# Patient Record
Sex: FEMALE | Race: WHITE | NOT HISPANIC OR LATINO | URBAN - METROPOLITAN AREA
[De-identification: names, ages, dates, MRNs, and addresses within clinical notes are randomized per-mention and may not be internally consistent; named-entity substitution may affect disease eponyms.]

---

## 2018-07-15 ENCOUNTER — INPATIENT (INPATIENT)
Facility: HOSPITAL | Age: 23
LOS: 1 days | Discharge: HOME | End: 2018-07-17
Attending: PLASTIC SURGERY | Admitting: PLASTIC SURGERY

## 2018-07-15 VITALS
RESPIRATION RATE: 20 BRPM | HEART RATE: 82 BPM | SYSTOLIC BLOOD PRESSURE: 87 MMHG | OXYGEN SATURATION: 100 % | DIASTOLIC BLOOD PRESSURE: 58 MMHG | TEMPERATURE: 97 F

## 2018-07-15 DIAGNOSIS — Z3A.25 25 WEEKS GESTATION OF PREGNANCY: ICD-10-CM

## 2018-07-15 DIAGNOSIS — T14.8XXA OTHER INJURY OF UNSPECIFIED BODY REGION, INITIAL ENCOUNTER: ICD-10-CM

## 2018-07-15 LAB
ALBUMIN SERPL ELPH-MCNC: 3.4 G/DL — LOW (ref 3.5–5.2)
ALP SERPL-CCNC: 75 U/L — SIGNIFICANT CHANGE UP (ref 30–115)
ALT FLD-CCNC: 10 U/L — SIGNIFICANT CHANGE UP (ref 0–41)
ANION GAP SERPL CALC-SCNC: 12 MMOL/L — SIGNIFICANT CHANGE UP (ref 7–14)
AST SERPL-CCNC: 16 U/L — SIGNIFICANT CHANGE UP (ref 0–41)
BASE EXCESS BLDV CALC-SCNC: 1.1 MMOL/L — SIGNIFICANT CHANGE UP (ref -2–2)
BILIRUB SERPL-MCNC: <0.2 MG/DL — SIGNIFICANT CHANGE UP (ref 0.2–1.2)
BUN SERPL-MCNC: 9 MG/DL — LOW (ref 10–20)
CA-I SERPL-SCNC: 1.19 MMOL/L — SIGNIFICANT CHANGE UP (ref 1.12–1.3)
CALCIUM SERPL-MCNC: 8.9 MG/DL — SIGNIFICANT CHANGE UP (ref 8.5–10.1)
CHLORIDE SERPL-SCNC: 106 MMOL/L — SIGNIFICANT CHANGE UP (ref 98–110)
CO2 SERPL-SCNC: 24 MMOL/L — SIGNIFICANT CHANGE UP (ref 17–32)
CREAT SERPL-MCNC: 0.5 MG/DL — LOW (ref 0.7–1.5)
GAS PNL BLDV: 139 MMOL/L — SIGNIFICANT CHANGE UP (ref 136–145)
GAS PNL BLDV: SIGNIFICANT CHANGE UP
GLUCOSE SERPL-MCNC: 90 MG/DL — SIGNIFICANT CHANGE UP (ref 70–99)
HCO3 BLDV-SCNC: 26 MMOL/L — SIGNIFICANT CHANGE UP (ref 22–29)
HCT VFR BLD CALC: 26.9 % — LOW (ref 37–47)
HCT VFR BLDA CALC: 30.5 % — LOW (ref 34–44)
HGB BLD CALC-MCNC: 10 G/DL — LOW (ref 14–18)
HGB BLD-MCNC: 9 G/DL — LOW (ref 12–16)
LACTATE BLDV-MCNC: 0.7 MMOL/L — SIGNIFICANT CHANGE UP (ref 0.5–1.6)
MCHC RBC-ENTMCNC: 28.2 PG — SIGNIFICANT CHANGE UP (ref 27–31)
MCHC RBC-ENTMCNC: 33.5 G/DL — SIGNIFICANT CHANGE UP (ref 32–37)
MCV RBC AUTO: 84.3 FL — SIGNIFICANT CHANGE UP (ref 81–99)
NRBC # BLD: 0 /100 WBCS — SIGNIFICANT CHANGE UP (ref 0–0)
PCO2 BLDV: 39 MMHG — LOW (ref 41–51)
PH BLDV: 7.42 — SIGNIFICANT CHANGE UP (ref 7.26–7.43)
PLATELET # BLD AUTO: 178 K/UL — SIGNIFICANT CHANGE UP (ref 130–400)
PO2 BLDV: 30 MMHG — SIGNIFICANT CHANGE UP (ref 20–40)
POTASSIUM BLDV-SCNC: 3.3 MMOL/L — SIGNIFICANT CHANGE UP (ref 3.3–5.6)
POTASSIUM SERPL-MCNC: 3.7 MMOL/L — SIGNIFICANT CHANGE UP (ref 3.5–5)
POTASSIUM SERPL-SCNC: 3.7 MMOL/L — SIGNIFICANT CHANGE UP (ref 3.5–5)
PROT SERPL-MCNC: 6.2 G/DL — SIGNIFICANT CHANGE UP (ref 6–8)
RBC # BLD: 3.19 M/UL — LOW (ref 4.2–5.4)
RBC # FLD: 15.2 % — HIGH (ref 11.5–14.5)
SAO2 % BLDV: 57 % — SIGNIFICANT CHANGE UP
SODIUM SERPL-SCNC: 142 MMOL/L — SIGNIFICANT CHANGE UP (ref 135–146)
WBC # BLD: 10.61 K/UL — SIGNIFICANT CHANGE UP (ref 4.8–10.8)
WBC # FLD AUTO: 10.61 K/UL — SIGNIFICANT CHANGE UP (ref 4.8–10.8)

## 2018-07-15 RX ORDER — BACITRACIN ZINC 500 UNIT/G
1 OINTMENT IN PACKET (EA) TOPICAL
Qty: 0 | Refills: 0 | Status: DISCONTINUED | OUTPATIENT
Start: 2018-07-15 | End: 2018-07-16

## 2018-07-15 RX ORDER — ACETAMINOPHEN 500 MG
650 TABLET ORAL EVERY 6 HOURS
Qty: 0 | Refills: 0 | Status: DISCONTINUED | OUTPATIENT
Start: 2018-07-15 | End: 2018-07-16

## 2018-07-15 RX ORDER — SODIUM CHLORIDE 9 MG/ML
3 INJECTION INTRAMUSCULAR; INTRAVENOUS; SUBCUTANEOUS EVERY 8 HOURS
Qty: 0 | Refills: 0 | Status: DISCONTINUED | OUTPATIENT
Start: 2018-07-15 | End: 2018-07-16

## 2018-07-15 RX ORDER — SODIUM CHLORIDE 9 MG/ML
1000 INJECTION INTRAMUSCULAR; INTRAVENOUS; SUBCUTANEOUS ONCE
Qty: 0 | Refills: 0 | Status: COMPLETED | OUTPATIENT
Start: 2018-07-15 | End: 2018-07-15

## 2018-07-15 RX ORDER — AMPICILLIN TRIHYDRATE 250 MG
1 CAPSULE ORAL ONCE
Qty: 0 | Refills: 0 | Status: COMPLETED | OUTPATIENT
Start: 2018-07-15 | End: 2018-07-15

## 2018-07-15 RX ORDER — CEFAZOLIN SODIUM 1 G
2000 VIAL (EA) INJECTION EVERY 8 HOURS
Qty: 0 | Refills: 0 | Status: DISCONTINUED | OUTPATIENT
Start: 2018-07-15 | End: 2018-07-16

## 2018-07-15 RX ORDER — SODIUM CHLORIDE 9 MG/ML
1000 INJECTION INTRAMUSCULAR; INTRAVENOUS; SUBCUTANEOUS
Qty: 0 | Refills: 0 | Status: DISCONTINUED | OUTPATIENT
Start: 2018-07-15 | End: 2018-07-16

## 2018-07-15 RX ADMIN — Medication 208 GRAM(S): at 23:45

## 2018-07-15 RX ADMIN — SODIUM CHLORIDE 2000 MILLILITER(S): 9 INJECTION INTRAMUSCULAR; INTRAVENOUS; SUBCUTANEOUS at 21:33

## 2018-07-15 RX ADMIN — SODIUM CHLORIDE 3 MILLILITER(S): 9 INJECTION INTRAMUSCULAR; INTRAVENOUS; SUBCUTANEOUS at 21:33

## 2018-07-15 NOTE — H&P ADULT - ASSESSMENT
23 year old female, currently 25 weeks pregnant (Due Date 10/28/2018), and no past medical history here with a RLE infected wound s/p spider bite 2 months ago, admitted to burn service for IV abx, local wound care and possible debridement.

## 2018-07-15 NOTE — ED ADULT NURSE NOTE - OBJECTIVE STATEMENT
RLE wound X 2 mo, no discharge, redness noted. No swelling. RLE wound X 2 mo, no discharge, redness noted. No swelling. Pt is 25 wk pregnant

## 2018-07-15 NOTE — ED PROVIDER NOTE - ATTENDING CONTRIBUTION TO CARE
24 y/o female, 25 weeks pregnant in ER with c/o eval for non-healing RLE wound.  Pt states she had ? bite to area 2 months ago, became red/swollen, went to PMD and took course of keflex - wound opened up and drained pus after abx but has never healed.  remains open, states drains some pus every day. no f/c.  no spreading erythema.  Pt went to [place] burn clinic earlier this week for eval and was told she will need debridement.  No other complaints, follows with midwife in [place] for pregnancy, denies any pregnancy related complications.  pe - nad, nc/at, eomi, perrl, op - clear, neck supple, cta b/, no w/r/r, rrr, abd- gravid, non-tender, RLE - + ~ 4 x 3 cm ulceration to medial distal lower leg, no drainage, + mild surrounding erythema, 2+ distal pulses, no calf swelling/tenderness  -burn eval.

## 2018-07-15 NOTE — ED PROVIDER NOTE - MEDICAL DECISION MAKING DETAILS
22 y/o female, 25 weeks pregnant, admitted to burn for debridement of non-healing RLE wound/ulcer.  seen by gyn in ER, + FH (done by gyn).

## 2018-07-15 NOTE — ED PROVIDER NOTE - PHYSICAL EXAMINATION
Vital Signs: I have reviewed the initial vital signs.  Constitutional: well-nourished, appears stated age, no acute distress  Right lower ext: + 4cm oval wound noted to medial right tibial area with redness, mild swelling and discharge; pulses and sensation intact; no calf tenderness; no lymphangitis.

## 2018-07-15 NOTE — ED PROVIDER NOTE - NS ED ROS FT
Constitutional: (-) fever  Cardiovascular: (-) syncope  Integumentary: (-) rash  Musculoskeltal: see hpi  Neurological: (-) altered mental status

## 2018-07-15 NOTE — H&P ADULT - PROBLEM SELECTOR PLAN 1
Admit to burn service  IV abx  IV fluids  Add to OR schedule tomorrow 7/16 for possible debridement with local anesthesia  Local wound care: bacitracin, adaptic  Pain control

## 2018-07-15 NOTE — H&P ADULT - HISTORY OF PRESENT ILLNESS
23 year old female, currently 25 weeks pregnant (Due Date 10/28/2018), with no past medical history presented to the ED c/o right lower ext wound infection from a spider bite 2 months ago. Patient states she was seen by her pmd given abx (keflex) 1month ago. Overtime the wound became swollen and after she was started on antibiotics the wound opened and puss drained. Since then patient has been applying manuka honey but has not seen any improvement with wound healing. Patient was seen in Burn Clinic with Dr. Sheppard last week and sent in for burn service evaluation/debridement. Patient reports pain that has improved over last couple of days. Patient states her pregnancy has been uncomplicated thus far. Pt deneis fever, chills, nausea, vomiting.

## 2018-07-15 NOTE — H&P ADULT - NSHPPHYSICALEXAM_GEN_ALL_CORE
General: breathing comfortably, in no acute distress  RLE Wound: Medial aspect of ankle, ~4cm x 2cm open wound with surrounding erythema and yellow inside, with thin purulent fluid, tender to palpation.

## 2018-07-15 NOTE — ED ADULT TRIAGE NOTE - CHIEF COMPLAINT QUOTE
Pt with right lower leg open wound from 2 month ago  from spider  bite infected need  debridement, pt 25 weeks pregnant .

## 2018-07-15 NOTE — ED PROVIDER NOTE - OBJECTIVE STATEMENT
23 yold female to Ed with no signif med hx currently 25 weeks pregnant c/o right lower ext wound infection; pt s/p ?spider bite 2 months ago seen by pmd given abx(keflex) 1month ago; pt with persistence of wound - see by Dr. Sheppard last week and sent if for burn service evaluation/debridement; pt deneis fever, chills, n/v; + discharge from wound;

## 2018-07-16 ENCOUNTER — TRANSCRIPTION ENCOUNTER (OUTPATIENT)
Age: 23
End: 2018-07-16

## 2018-07-16 ENCOUNTER — RESULT REVIEW (OUTPATIENT)
Age: 23
End: 2018-07-16

## 2018-07-16 VITALS
OXYGEN SATURATION: 100 % | TEMPERATURE: 97 F | DIASTOLIC BLOOD PRESSURE: 55 MMHG | HEART RATE: 80 BPM | RESPIRATION RATE: 18 BRPM | SYSTOLIC BLOOD PRESSURE: 90 MMHG

## 2018-07-16 LAB
ANION GAP SERPL CALC-SCNC: 17 MMOL/L — HIGH (ref 7–14)
BLD GP AB SCN SERPL QL: SIGNIFICANT CHANGE UP
BUN SERPL-MCNC: 5 MG/DL — LOW (ref 10–20)
CALCIUM SERPL-MCNC: 8.6 MG/DL — SIGNIFICANT CHANGE UP (ref 8.5–10.1)
CHLORIDE SERPL-SCNC: 100 MMOL/L — SIGNIFICANT CHANGE UP (ref 98–110)
CO2 SERPL-SCNC: 20 MMOL/L — SIGNIFICANT CHANGE UP (ref 17–32)
CREAT SERPL-MCNC: 0.5 MG/DL — LOW (ref 0.7–1.5)
GLUCOSE SERPL-MCNC: 67 MG/DL — LOW (ref 70–99)
HCT VFR BLD CALC: 25.6 % — LOW (ref 37–47)
HGB BLD-MCNC: 8.5 G/DL — LOW (ref 12–16)
MAGNESIUM SERPL-MCNC: 1.8 MG/DL — SIGNIFICANT CHANGE UP (ref 1.8–2.4)
MCHC RBC-ENTMCNC: 28.1 PG — SIGNIFICANT CHANGE UP (ref 27–31)
MCHC RBC-ENTMCNC: 33.2 G/DL — SIGNIFICANT CHANGE UP (ref 32–37)
MCV RBC AUTO: 84.8 FL — SIGNIFICANT CHANGE UP (ref 81–99)
NRBC # BLD: 0 /100 WBCS — SIGNIFICANT CHANGE UP (ref 0–0)
PHOSPHATE SERPL-MCNC: 3.5 MG/DL — SIGNIFICANT CHANGE UP (ref 2.1–4.9)
PLATELET # BLD AUTO: 160 K/UL — SIGNIFICANT CHANGE UP (ref 130–400)
POTASSIUM SERPL-MCNC: 3.6 MMOL/L — SIGNIFICANT CHANGE UP (ref 3.5–5)
POTASSIUM SERPL-SCNC: 3.6 MMOL/L — SIGNIFICANT CHANGE UP (ref 3.5–5)
RBC # BLD: 3.02 M/UL — LOW (ref 4.2–5.4)
RBC # FLD: 15.2 % — HIGH (ref 11.5–14.5)
SODIUM SERPL-SCNC: 137 MMOL/L — SIGNIFICANT CHANGE UP (ref 135–146)
TYPE + AB SCN PNL BLD: SIGNIFICANT CHANGE UP
WBC # BLD: 12.43 K/UL — HIGH (ref 4.8–10.8)
WBC # FLD AUTO: 12.43 K/UL — HIGH (ref 4.8–10.8)

## 2018-07-16 RX ORDER — ACETAMINOPHEN 500 MG
650 TABLET ORAL EVERY 6 HOURS
Qty: 0 | Refills: 0 | Status: DISCONTINUED | OUTPATIENT
Start: 2018-07-16 | End: 2018-07-17

## 2018-07-16 RX ORDER — MORPHINE SULFATE 50 MG/1
2 CAPSULE, EXTENDED RELEASE ORAL
Qty: 0 | Refills: 0 | Status: DISCONTINUED | OUTPATIENT
Start: 2018-07-16 | End: 2018-07-17

## 2018-07-16 RX ORDER — OXYCODONE AND ACETAMINOPHEN 5; 325 MG/1; MG/1
1 TABLET ORAL EVERY 4 HOURS
Qty: 0 | Refills: 0 | Status: DISCONTINUED | OUTPATIENT
Start: 2018-07-16 | End: 2018-07-17

## 2018-07-16 RX ORDER — AMPICILLIN SODIUM AND SULBACTAM SODIUM 250; 125 MG/ML; MG/ML
1.5 INJECTION, POWDER, FOR SUSPENSION INTRAMUSCULAR; INTRAVENOUS EVERY 6 HOURS
Qty: 0 | Refills: 0 | Status: DISCONTINUED | OUTPATIENT
Start: 2018-07-16 | End: 2018-07-16

## 2018-07-16 RX ORDER — AMPICILLIN SODIUM AND SULBACTAM SODIUM 250; 125 MG/ML; MG/ML
1.5 INJECTION, POWDER, FOR SUSPENSION INTRAMUSCULAR; INTRAVENOUS EVERY 6 HOURS
Qty: 0 | Refills: 0 | Status: DISCONTINUED | OUTPATIENT
Start: 2018-07-16 | End: 2018-07-17

## 2018-07-16 RX ORDER — SODIUM CHLORIDE 9 MG/ML
1000 INJECTION, SOLUTION INTRAVENOUS
Qty: 0 | Refills: 0 | Status: DISCONTINUED | OUTPATIENT
Start: 2018-07-16 | End: 2018-07-16

## 2018-07-16 RX ORDER — BACITRACIN ZINC 500 UNIT/G
1 OINTMENT IN PACKET (EA) TOPICAL
Qty: 0 | Refills: 0 | Status: DISCONTINUED | OUTPATIENT
Start: 2018-07-16 | End: 2018-07-17

## 2018-07-16 RX ORDER — ACETAMINOPHEN 500 MG
2 TABLET ORAL
Qty: 0 | Refills: 0 | COMMUNITY
Start: 2018-07-16

## 2018-07-16 RX ORDER — ACETAMINOPHEN 500 MG
1000 TABLET ORAL ONCE
Qty: 0 | Refills: 0 | Status: DISCONTINUED | OUTPATIENT
Start: 2018-07-16 | End: 2018-07-16

## 2018-07-16 RX ADMIN — SODIUM CHLORIDE 75 MILLILITER(S): 9 INJECTION INTRAMUSCULAR; INTRAVENOUS; SUBCUTANEOUS at 00:24

## 2018-07-16 RX ADMIN — SODIUM CHLORIDE 3 MILLILITER(S): 9 INJECTION INTRAMUSCULAR; INTRAVENOUS; SUBCUTANEOUS at 05:51

## 2018-07-16 RX ADMIN — Medication 1 APPLICATION(S): at 05:52

## 2018-07-16 RX ADMIN — Medication 650 MILLIGRAM(S): at 23:49

## 2018-07-16 RX ADMIN — Medication 650 MILLIGRAM(S): at 21:58

## 2018-07-16 RX ADMIN — AMPICILLIN SODIUM AND SULBACTAM SODIUM 100 GRAM(S): 250; 125 INJECTION, POWDER, FOR SUSPENSION INTRAMUSCULAR; INTRAVENOUS at 20:01

## 2018-07-16 RX ADMIN — AMPICILLIN SODIUM AND SULBACTAM SODIUM 100 GRAM(S): 250; 125 INJECTION, POWDER, FOR SUSPENSION INTRAMUSCULAR; INTRAVENOUS at 06:48

## 2018-07-16 RX ADMIN — SODIUM CHLORIDE 3 MILLILITER(S): 9 INJECTION INTRAMUSCULAR; INTRAVENOUS; SUBCUTANEOUS at 13:07

## 2018-07-16 RX ADMIN — SODIUM CHLORIDE 75 MILLILITER(S): 9 INJECTION INTRAMUSCULAR; INTRAVENOUS; SUBCUTANEOUS at 12:04

## 2018-07-16 RX ADMIN — AMPICILLIN SODIUM AND SULBACTAM SODIUM 100 GRAM(S): 250; 125 INJECTION, POWDER, FOR SUSPENSION INTRAMUSCULAR; INTRAVENOUS at 12:04

## 2018-07-16 NOTE — DISCHARGE NOTE ADULT - CARE PROVIDER_API CALL
Diony Sheppard), Plastic Surgery  [address] [place] [address]  Phone: [phone]  Fax: [phone]

## 2018-07-16 NOTE — PRE-OP CHECKLIST - 1.
pt to pre op accompanied by family,rt ac ivl in place, pt is 25 weeks pregnant, ,fetal monitoring done in pre op by  gyn resident,right lower extremity kerlex dressing noted.pt anxious ,emotional support rendered.

## 2018-07-16 NOTE — PHYSICAL THERAPY INITIAL EVALUATION ADULT - SPECIFY REASON(S)
No PT secondary pt on the way to OR for procedure.
Pt &  educated on purpose of PT, however both pt &  declining @ this time. continued below...

## 2018-07-16 NOTE — DISCHARGE NOTE ADULT - PLAN OF CARE
Proper wound healing Follow up in clinic 7/17 for detailed wound care instructions  Do not shower or change bandage tonight 7/16

## 2018-07-16 NOTE — BRIEF OPERATIVE NOTE - POST-OP DX
Wound of right leg  07/16/2018  infected 5 x 4 cm - multiple ulcers/ sinuses  Active  Diony Sheppard

## 2018-07-16 NOTE — CHART NOTE - NSCHARTNOTEFT_GEN_A_CORE
PGY2 Note    Pt seen and examined at bedside for  pre-op FH  FH 160bpm, +FM    -Will evaluated postop for FH    Dr. Desouza and Dr. Tyson aware.

## 2018-07-16 NOTE — PROGRESS NOTE ADULT - ATTENDING COMMENTS
Long discussion with pt and  regarding surgery and further mgmt, including poss STSG. Questions addressed

## 2018-07-16 NOTE — BRIEF OPERATIVE NOTE - PROCEDURE
<<-----Click on this checkbox to enter Procedure Debridement and irrigation of wound of extremity  07/16/2018  Right leg - 5 x 4 cm   to and including subcutaneous fat  Active  KACIE

## 2018-07-16 NOTE — PROGRESS NOTE ADULT - SUBJECTIVE AND OBJECTIVE BOX
Pt admitted o/n see H&P   stable overnight -anxious to have procedure done  Pt: no specific complaints; reports worsening of wound following office visit- photo reviewed   No acute events o/n  Vital Signs Last 24 Hrs  T(C): 36.6 (16 Jul 2018 09:10), Max: 36.6 (15 Jul 2018 23:49)  T(F): 97.8 (16 Jul 2018 09:10), Max: 97.8 (15 Jul 2018 23:49)  HR: 71 (16 Jul 2018 09:10) (71 - 82)  BP: 105/75 (16 Jul 2018 09:10) (82/45 - 105/75)  RR: 19 (16 Jul 2018 09:10) (16 - 24)  SpO2: 100% (16 Jul 2018 09:10) (100% - 100%)      07-15    142  |  106  |  9<L>  ----------------------------<  90  3.7   |  24  |  0.5<L>    Ca    8.9      15 Jul 2018 21:14    TPro  6.2  /  Alb  3.4<L>  /  TBili  <0.2  /  DBili  x   /  AST  16  /  ALT  10  /  AlkPhos  75  07-15                          9.0    10.61 )-----------( 178      ( 15 Jul 2018 21:14 )             26.9     EXAM:  Wound: right leg - 5 x 4 cm area of skin with erythema, inferior 1cm ulcer and multiple sinuses. purulent drainage expressed               tender

## 2018-07-16 NOTE — DISCHARGE NOTE ADULT - ADDITIONAL INSTRUCTIONS
Call [phone] tonight and leave a message stating that you were discharged from the hospital today and need to be seen in the office tomorrow 7/17. If you do not receive a call from the office by 9 AM on 7/17 then call again.

## 2018-07-16 NOTE — DISCHARGE NOTE ADULT - HOSPITAL COURSE
Patient is a 24 y/o F  EGA 25 weeks no other PMH presented with 2 week old infected spider bite. Patient was treated with IV fluids, IV antibiotics, local wound care, pain management. Patient was taken to the OR on  for debridement and irrigation of the wound. Patient was encouraged to stay for another 24 hours but was anxious to go home so the patient was cleared by the attending to be discharged with follow up in clinic .

## 2018-07-16 NOTE — DISCHARGE NOTE ADULT - PATIENT PORTAL LINK FT
You can access the [facility] Patient Portal, offered by [facility], by registering with the following website: [email or web address]

## 2018-07-16 NOTE — CONSULT NOTE ADULT - SUBJECTIVE AND OBJECTIVE BOX
PATEL, SISI  23y, Female  Allergy: Acidophilus (Unknown)      HPI:  23 year old female, currently 25 weeks pregnant (Due Date 10/28/2018), with no past medical history presented to the ED c/o right lower ext wound infection from a spider bite 2 months ago. Patient states she was seen by her pmd given abx (keflex) 1month ago. Overtime the wound became swollen and after she was started on antibiotics the wound opened and puss drained. Since then patient has been applying manuka honey but has not seen any improvement with wound healing. Patient was seen in Burn Clinic with Dr. Sheppard last week and sent in for burn service evaluation/debridement. Patient reports pain that has improved over last couple of days. Patient states her pregnancy has been uncomplicated thus far. Pt deneis fever, chills, nausea, vomiting. (15 Jul 2018 23:25)    FAMILY HISTORY:    PAST MEDICAL & SURGICAL HISTORY:  No pertinent past medical history  No significant past surgical history        VITALS:  T(F): 97.8, Max: 97.8 (07-15-18 @ 23:49)  HR: 71  BP: 105/75  RR: 19Vital Signs Last 24 Hrs  T(C): 36.6 (16 Jul 2018 09:10), Max: 36.6 (15 Jul 2018 23:49)  T(F): 97.8 (16 Jul 2018 09:10), Max: 97.8 (15 Jul 2018 23:49)  HR: 71 (16 Jul 2018 09:10) (71 - 82)  BP: 105/75 (16 Jul 2018 09:10) (82/45 - 105/75)  BP(mean): --  RR: 19 (16 Jul 2018 09:10) (16 - 24)  SpO2: 100% (16 Jul 2018 09:10) (100% - 100%)    TESTS & MEASUREMENTS:                        9.0    10.61 )-----------( 178      ( 15 Jul 2018 21:14 )             26.9     07-15    142  |  106  |  9<L>  ----------------------------<  90  3.7   |  24  |  0.5<L>    Ca    8.9      15 Jul 2018 21:14    TPro  6.2  /  Alb  3.4<L>  /  TBili  <0.2  /  DBili  x   /  AST  16  /  ALT  10  /  AlkPhos  75  07-15    LIVER FUNCTIONS - ( 15 Jul 2018 21:14 )  Alb: 3.4 g/dL / Pro: 6.2 g/dL / ALK PHOS: 75 U/L / ALT: 10 U/L / AST: 16 U/L / GGT: x                   RADIOLOGY & ADDITIONAL TESTS:    ANTIBIOTICS:

## 2018-07-16 NOTE — BRIEF OPERATIVE NOTE - PRE-OP DX
Wound of right leg  07/16/2018  infected - 5 x 4 cm - miltiple ulcerations and sinuses  Active  Diony Sheppard

## 2018-07-16 NOTE — OCCUPATIONAL THERAPY INITIAL EVALUATION ADULT - REHAB POTENTIAL, OT EVAL
Pt ind with adls and transfers. Pt educated re hep to maintain rom during healing phase. OT to cont as needed.

## 2018-07-16 NOTE — CHART NOTE - NSCHARTNOTEFT_GEN_A_CORE
PACU ANESTHESIA ADMISSION NOTE      Procedure: debridement of right lower extremity     Post op diagnosis:  infected wound of right leg       ____  Intubated  TV:______       Rate: ______      FiO2: ______    _x___  Patent Airway    _x___  Full return of protective reflexes    _x___  Full recovery from anesthesia / back to baseline status    Vitals:  T(C): 98.3  HR: 80  BP: 94/60  RR: 16  SpO2: 100%     Mental Status:  _x___ Awake   ___x__ Alert   _____ Drowsy   _____ Sedated    Nausea/Vomiting:  _x___  NO       ______Yes,   See Post - Op Orders         Pain Scale (0-10):  __0___    Treatment: ____ None    _x___ See Post - Op/PCA Orders    Post - Operative Fluids:   ____ Oral   __x__ See Post - Op Orders    Plan: Discharge:   ____Home       __x___Floor     _____Critical Care    _____  Other:_________________    Comments:  No anesthesia issues or complications noted.  Discharge when criteria met.  Report given to receiving RN, all questions answered.    OB/GYN called, to come to recovery to do a post op ultrasound.

## 2018-07-16 NOTE — DISCHARGE NOTE ADULT - CARE PLAN
Principal Discharge DX:	Infected wound  Goal:	Proper wound healing  Assessment and plan of treatment:	Follow up in clinic 7/17 for detailed wound care instructions  Do not shower or change bandage tonight 7/16

## 2018-07-16 NOTE — CHART NOTE - NSCHARTNOTEFT_GEN_A_CORE
PGY 2 Note    Pt evaluated post op, FH pos, 153 bpm, +gross fetal movement.     Dr. Desouza and Dr. Mercado aware.

## 2018-07-16 NOTE — CONSULT NOTE ADULT - SUBJECTIVE AND OBJECTIVE BOX
Chief Complaint: spider bite on leg    HPI:  23  at 25w (BECCA 10/28/18) dated by LMP, here admitted to Burn for right leg spider bite. Pt had a right leg infected area on lower leg, that has been getting worse over the past 2 months, with multiple courses of PO abx. Pt has associated pain and draining from the wound. Pt is also pregnant, follows [place] mid[facility], has uncomplicated prenatal care. Last saw CNM was 1 week ago. 20w anatomy sono was normal. Pt denies any obstetric complaints, denies LOF, VB, or CTX. She feels good fetal movement. Denies complications with the pregnancy.     MEDICATIONS:  ampicillin  IVPB: 208 IV Intermittent (07-15 @ 23:45)  (Floorstock): 1 &lt;See Task&gt; (07-15 @ 23:31)  sodium chloride 0.9% lock flush: 3 IV Push (07-15 @ 21:33)  sodium chloride 0.9% Bolus: 2000 IV Bolus (07-15 @ 21:33)    ALLERGIES:  Acidophilus (Unknown)    PAST MEDICAL AND SURGICAL HISTORY:  PAST MEDICAL & SURGICAL HISTORY:  No pertinent past medical history  No significant past surgical history    OB/GYN HISTORY:     LMP:  unsure    Cycles: regular  Gyn Hx: denies history of abnormal pap, STI, ovarian cysts, or uterine fibroids  Obstetric Hx: primigravid     FAMILY HISTORY:  Non-contributory    SOCIAL HISTORY:   Denies cigarette use, alcohol, or other drugs    Review of Systems:  Negative except as above    PHYSICAL EXAM:  T(F): 97.8 (07-15 @ 23:49), Max: 97.8 (07-15 @ 23:49)  HR: 81 (07-15 @ 23:49)  BP: 97/54 (07-15 @ 23:49)  RR: 16 (07-15 @ 23:49)  Constitutional: AAOx3, NAD  Respiratory: CTAB  Cardiovascular: NL S1/S2  Gastrointestinal: Soft, gravid, nontender, nondistended, no rebound, guarding, or rigidity  Pelvic: deferred    LABS:                        9.0    10.61 )-----------( 178      ( 15 Jul 2018 21:14 )             26.9     07-15    142  |  106  |  9<L>  ----------------------------<  90  3.7   |  24  |  0.5<L>    Ca    8.9      15 Jul 2018 21:14    TPro  6.2  /  Alb  3.4<L>  /  TBili  <0.2  /  DBili  x   /  AST  16  /  ALT  10  /  AlkPhos  75  07-15    RADIOLOGY & ADDITIONAL STUDIES:  Bedside sonogram: breech, FH 153bpm, MVP 4.1cm, pos fm, ant placenta. (patient refused full EFW sonogram)    ASSESSMENT:  23y in the ED, admitted to Burn unit for spider bite, on call to OR for debridement, consulted obgyn for pregnancy, clinically and hemodynamically stable with no acute obgyn intervention at this time    PLAN:  -on call to OR per Burn, likely will have local or regional anesthesia  -Ancef safe in pregnancy  -recc iv hydration  -will need to have pos FH done prior to discharge after debridement  -gyn will follow while in house  -upon discharge will f/u with CNM in [place] in 1 week Chief Complaint: spider bite on leg    HPI:  23  at 25w (BECCA 10/28/18) dated by LMP, here admitted to Burn for right leg spider bite. Pt had a right leg infected area on lower leg, that has been getting worse over the past 2 months, with multiple courses of PO abx. Pt has associated pain and draining from the wound. Pt is also pregnant, follows [place] midwives, has uncomplicated prenatal care. Last saw CNM was 1 week ago. 20w anatomy sono was normal. Pt denies any obstetric complaints, denies LOF, VB, or CTX. She feels good fetal movement. Denies complications with the pregnancy.     MEDICATIONS:  ampicillin  IVPB: 208 IV Intermittent (07-15 @ 23:45)  (Floorstock): 1 &lt;See Task&gt; (07-15 @ 23:31)  sodium chloride 0.9% lock flush: 3 IV Push (07-15 @ 21:33)  sodium chloride 0.9% Bolus: 2000 IV Bolus (07-15 @ 21:33)    ALLERGIES:  Acidophilus (Unknown)    PAST MEDICAL AND SURGICAL HISTORY:  PAST MEDICAL & SURGICAL HISTORY:  No pertinent past medical history  No significant past surgical history    OB/GYN HISTORY:     LMP:  unsure    Cycles: regular  Gyn Hx: denies history of abnormal pap, STI, ovarian cysts, or uterine fibroids  Obstetric Hx: primigravid     FAMILY HISTORY:  Non-contributory    SOCIAL HISTORY:   Denies cigarette use, alcohol, or other drugs    Review of Systems:  Negative except as above    PHYSICAL EXAM:  T(F): 97.8 (07-15 @ 23:49), Max: 97.8 (07-15 @ 23:49)  HR: 81 (07-15 @ 23:49)  BP: 97/54 (07-15 @ 23:49)  RR: 16 (07-15 @ 23:49)  Constitutional: AAOx3, NAD  Respiratory: CTAB  Cardiovascular: NL S1/S2  Gastrointestinal: Soft, gravid, nontender, nondistended, no rebound, guarding, or rigidity  Pelvic: deferred  Ext: right leg 4x3cm area of infection noted on the lower leg, purulent, indurated, erythematous    LABS:                        9.0    10.61 )-----------( 178      ( 15 Jul 2018 21:14 )             26.9     07-15    142  |  106  |  9<L>  ----------------------------<  90  3.7   |  24  |  0.5<L>    Ca    8.9      15 Jul 2018 21:14    TPro  6.2  /  Alb  3.4<L>  /  TBili  <0.2  /  DBili  x   /  AST  16  /  ALT  10  /  AlkPhos  75  07-15    RADIOLOGY & ADDITIONAL STUDIES:  Bedside sonogram: breech, FH 153bpm, MVP 4.1cm, pos fm, ant placenta. (patient refused full EFW sonogram)    ASSESSMENT:  23y in the ED, admitted to Burn unit for spider bite, on call to OR for debridement, consulted obgyn for pregnancy, clinically and hemodynamically stable with no acute obgyn intervention at this time    PLAN:  -on call to OR per Burn, likely will have local or regional anesthesia  -Ancef safe in pregnancy  -recc iv hydration  -will need to have pos FH done prior to discharge after debridement  -gyn will follow while in house  -upon discharge will f/u with CNNOEMI in [place] in 1 week

## 2018-07-16 NOTE — DISCHARGE NOTE ADULT - MEDICATION SUMMARY - MEDICATIONS TO TAKE
I will START or STAY ON the medications listed below when I get home from the hospital:    acetaminophen 325 mg oral tablet  -- 2 tab(s) by mouth every 6 hours, As needed, Mild Pain (1 - 3)  -- Indication: For Infected wound

## 2018-07-16 NOTE — PHYSICAL THERAPY INITIAL EVALUATION ADULT - GENERAL OBSERVATIONS, REHAB EVAL
...Pt reports she has no difficulty with mobility & has been ambulating independently. Pt declining evaluation. Requesting f/u after her procedure, however unsure when that will occur. PT to f/u as appropriate.

## 2018-07-17 NOTE — CONSULT NOTE ADULT - ASSESSMENT
Assessment: 23F unremarkable PMHx/PSHx w/ RLE wound/abscess secondary to possible insect bite. Periwound erythema present, +purulent drainage, +necrotic wound base, +clinical signs of infection.     Plan:   Pt add on case w/  for debridement of RLE  c/w Unasyn 1.5g q6h  Local wound care
IMPRESSION:  subcutaneous abscess with cellulitis RLE.  No sepsis.    RECOMMENDATIONS  Unasyn 3 gm iv q6h  Surgical debridement with deep tissue cultures.

## 2018-07-17 NOTE — CONSULT NOTE ADULT - SUBJECTIVE AND OBJECTIVE BOX
PROGRESS NOTE   Patient is a 23y old  Female who presents with a chief complaint of RLE wound from spider bite (16 Jul 2018 21:51)      HPI:  23 year old female, currently 25 weeks pregnant (Due Date 10/28/2018), with no past medical history presented to the ED c/o right lower ext wound infection from a spider bite 2 months ago. Patient states she was seen by her pmd given abx (keflex) 1month ago. Overtime the wound became swollen and after she was started on antibiotics the wound opened and puss drained. Since then patient has been applying manuka honey but has not seen any improvement with wound healing. Patient was seen in Burn Clinic with Dr. Sheppard last week and sent in for burn service evaluation/debridement. Patient reports pain that has improved over last couple of days. Patient states her pregnancy has been uncomplicated thus far. Pt deneis fever, chills, nausea, vomiting. (15 Jul 2018 23:25)      INFECTED WOUND;RIGHT LEG INJURY  ^BRUISE ON LEG  Handoff  MEWS Score  No pertinent past medical history  Wound of right leg  Wound of right leg  Debridement and irrigation of wound of extremity  Infected wound  Infected wound  25 weeks gestation of pregnancy  Infected wound  Debridement and irrigation of wound of extremity  No significant past surgical history  BRUISE ON LEG  Right leg injury      VITALS:  Vital Signs Last 24 Hrs  T(C): 36.3 (16 Jul 2018 23:07), Max: 37.1 (16 Jul 2018 18:40)  T(F): 97.4 (16 Jul 2018 23:07), Max: 98.7 (16 Jul 2018 18:40)  HR: 80 (16 Jul 2018 23:07) (74 - 86)  BP: 90/55 (16 Jul 2018 23:07) (89/49 - 104/57)  BP(mean): --  RR: 18 (16 Jul 2018 23:07) (16 - 22)  SpO2: 100% (16 Jul 2018 23:07) (97% - 100%)    LABS:                        8.5    12.43 )-----------( 160      ( 16 Jul 2018 18:40 )             25.6     07-16    137  |  100  |  5<L>  ----------------------------<  67<L>  3.6   |  20  |  0.5<L>    Ca    8.6      16 Jul 2018 18:40  Phos  3.5     07-16  Mg     1.8     07-16    TPro  6.2  /  Alb  3.4<L>  /  TBili  <0.2  /  DBili  x   /  AST  16  /  ALT  10  /  AlkPhos  75  07-15      PHYSICAL EXAM  GEN: SISI PATEL is a pleasant well-nourished, well developed 23y Female in no acute distress, alert awake, and oriented to person, place and time.     Medication(s):   acetaminophen   Tablet. 650 milliGRAM(s) Oral every 6 hours PRN  ampicillin/sulbactam  IVPB 1.5 Gram(s) IV Intermittent every 6 hours  BACItracin   Ointment 1 Application(s) Topical two times a day  morphine   Solution 2 milliGRAM(s) Oral four times a day PRN  oxyCODONE    5 mG/acetaminophen 325 mG 1 Tablet(s) Oral every 4 hours PRN    Vital Signs Last 24 Hrs  T(F): 97.4 (16 Jul 2018 23:07), Max: 98.7 (16 Jul 2018 18:40)  HR: 80 (16 Jul 2018 23:07) (74 - 86)  BP: 90/55 (16 Jul 2018 23:07) (89/49 - 104/57)  RR: 18 (16 Jul 2018 23:07) (16 - 22)  SpO2: 100% (16 Jul 2018 23:07) (97% - 100%)    PHYSICAL EXAM:  GENERAL: NAD, well-groomed, well-developed  HEAD:  Atraumatic, Normocephalic  EYES: EOMI, PERRLA, conjunctiva and sclera clear  ENMT: Moist mucous membranes, Good dentition  NECK: Supple, No JVD  CHEST/LUNG: Clear to auscultation bilaterally; No rales, rhonchi, wheezing, or rubs  HEART: Regular rate and rhythm; S1/S2, No murmurs  ABDOMEN: Soft, Nontender, Nondistended; Bowel sounds present  VASCULAR: Normal pulses, Normal capillary refill  EXTREMITIES: No calf tenderness, No cyanosis, No edema  LYMPH: No lymphadenopathy noted  SKIN: RLE wound   PSYCH: Normal mood and affect  NERVOUS SYSTEM:  A/O x3, Good concentration; CN 2-12 intact, No focal deficits    REVIEW OF SYSTEMS:  CONSTITUTIONAL: No fever, weight loss, or fatigue  EYES: No eye pain, visual disturbances, or discharge  ENMT:  No difficulty hearing, tinnitus, vertigo; No sinus or throat pain  NECK: No neck pain or neck stiffness  BREASTS: No pain, masses, or nipple discharge  RESPIRATORY: No cough, wheezing, chills or hemoptysis; No shortness of breath  CARDIOVASCULAR: No chest pain, palpitations, dizziness, or leg swelling  GASTROINTESTINAL: No abdominal pain, No nausea, vomiting, or hematemesis; No diarrhea or constipation  GENITOURINARY: No dysuria, frequency, hematuria, or incontinence  NEUROLOGICAL: No headaches, memory loss, loss of strength, numbness, or tremors  SKIN: RLE wound   LYMPH NODES: No enlarged glands  ENDOCRINE: No heat or cold intolerance; No hair loss  MUSCULOSKELETAL: No joint pain or swelling; No muscle, back, or extremity pain  PSYCHIATRIC: No depression, anxiety, mood swings, or difficulty sleeping  HEME/LYMPH: No easy bruising or bleeding  ALLERY AND IMMUNOLOGIC: No hives or eczema    All other ROS reviewed and negative except as otherwise stated    Culture - Other (07.15.18 @ 23:00)    Specimen Source: .Other wound right leg    Culture Results:   Insufficient growth-culture reincubated    Specimen Source: .Other wound right leg (07.15.18 @ 23:00)

## 2018-07-18 LAB
CULTURE RESULTS: SIGNIFICANT CHANGE UP
SPECIMEN SOURCE: SIGNIFICANT CHANGE UP
SURGICAL PATHOLOGY STUDY: SIGNIFICANT CHANGE UP

## 2018-07-24 DIAGNOSIS — L03.115 CELLULITIS OF RIGHT LOWER LIMB: ICD-10-CM

## 2018-07-24 DIAGNOSIS — L02.415 CUTANEOUS ABSCESS OF RIGHT LOWER LIMB: ICD-10-CM

## 2018-07-24 DIAGNOSIS — L08.89 OTHER SPECIFIED LOCAL INFECTIONS OF THE SKIN AND SUBCUTANEOUS TISSUE: ICD-10-CM

## 2018-07-24 DIAGNOSIS — O99.712 DISEASES OF THE SKIN AND SUBCUTANEOUS TISSUE COMPLICATING PREGNANCY, SECOND TRIMESTER: ICD-10-CM

## 2018-07-24 DIAGNOSIS — Z3A.25 25 WEEKS GESTATION OF PREGNANCY: ICD-10-CM

## 2018-08-04 NOTE — OCCUPATIONAL THERAPY INITIAL EVALUATION ADULT - MANUAL MUSCLE TESTING RESULTS, REHAB EVAL
no strength deficits were identified
Implemented All Fall with Harm Risk Interventions:  [place] to call system. Call bell, personal items and telephone within reach. Instruct patient to call for assistance. Room bathroom lighting operational. Non-slip footwear when patient is off stretcher. Physically safe environment: no spills, clutter or unnecessary equipment. Stretcher in lowest position, wheels locked, appropriate side rails in place. Provide visual cue, wrist band, yellow gown, etc. Monitor gait and stability. Monitor for mental status changes and reorient to person, place, and time. Review medications for side effects contributing to fall risk. Reinforce activity limits and safety measures with patient and family. Provide visual clues: red socks.

## 2018-09-26 PROBLEM — Z00.00 ENCOUNTER FOR PREVENTIVE HEALTH EXAMINATION: Status: ACTIVE | Noted: 2018-09-26

## 2018-09-27 ENCOUNTER — APPOINTMENT (OUTPATIENT)
Dept: BURN CARE | Facility: CLINIC | Age: 23
End: 2018-09-27

## 2020-06-19 NOTE — PRE-ANESTHESIA EVALUATION ADULT - NSANTHAPLANRD_GEN_ALL_CORE
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 \\n\\nPatient has been treated in the past with multiple topical and systemic CORTICOSTEROIDS (MOMETASONE, CLOBETASOL, TRIAMCINOLONE, HYDROCORTISONE, PREDNISONE), topical and systemic antifungals (KETOCONAZOLE, oral GRISEOFULVIN), PIMECROLIMUS topical cream with suboptimal control of symptoms.\\n\\nPatient also with Dennie-Joel lines, lichen simplex chronicus, lichen nitidus, and keratosis pilaris\\n\\nWe will initiate protocol and treatment with DUPIXENT Detail Level: Simple regional/general

## 2021-05-20 NOTE — PRE-ANESTHESIA EVALUATION ADULT - NSATTENDATTESTRD_GEN_ALL_CORE
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 denies N/V/D, fever or chills. reports regular and normal menstruations.
The patient has been re-examined and I agree with the above assessment or I updated with my findings.

## 2022-04-12 NOTE — PRE-OP CHECKLIST - NOTHING BY MOUTH SINCE
D/C/Event Note Event Note Pre Operative Note/Event Note Event Note Heme/Onc IR Interventional Radiology/Event Note Interventional Radiology/Event Note Post Op Check d/c appointment/Event Note d/c appointment/Event Note d/c appointment/Event Note 16-Jul-2018 00:00

## 2024-03-26 NOTE — H&P ADULT - CLICK TO LAUNCH ORM
"Weight Management Medical Nutrition Assessment  Is here for [facility] f/u.  Current wt: 176.8 lbs. She has lost 27.8 lbs since July 2019. Cooking more than prior which has been positive for her. Still feels some guilt when her  is unable to eat but for the most part this has not been an issue. Has a friend that she goes to lunch with once per week. Seeing a new physician for back pain and will be having an EMG as her MRI showed no abnormalities. She has no concerns at this time. She will f/u in 1 month.     Dislikes:      Anthropometric Measurements  Start Weight (lbs): 204.6 lbs 7/11/19  Current wt:  176.8 lbs  %TBW loss: 13.5%   Ideal Body Weight (lbs): 150.1 lbs BMI 25 (65\" 125 lbs)  Goal Weight (lbs): 160 lbs (last weighed this a long time ago)  Highest: 208 lbs  Lowest:  135 lbs  UBW: 160 lbs     Weight Loss History  Previous weight loss attempts: Commercial Programs (Weight Watchers, Meseret Soham, etc.)  Exercise  Self Created Diets (Portion Control, Healthy Food Choices, etc.)     Food and Nutrition Related History  Wake up: 10:00     Bed Time: 12:00     Food Recall  Breakfast: 10:30: protein bar   Snack: skip   Lunch: 2:30-3:00: healthy choice power bowls, diet coke OR opposite of breakfast OR coleslaw mix, pistachios, cheese, turkey, poppyseed dressing  Snack: skip  Dinner: 7 p.m. healthy choice meal OR eggs, veggies, yogurt dip OR shrimp, pasta, sauce OR  chicken, couscous, veggies  Snack: skip OR ice cream     Beverages: diet coke, coffee, water   Volume of beverage intake:  96 oz water, 1 cup coffee w/h/h, few diet coke/day     Weekends: Same  Cravings: sweets  Trouble area of day: 6:00 p.m. >>>better     Frequency of Eating out: every few weeks  Food restrictions: n/a  Cooking: self  Food Shopping: self     Physical Activity Intake  Activity: none currently  Frequency: n/a  Physical limitations/barriers to exercise:  back pain>>>better     Estimated Needs  Energy  Mando Camejo Energy Needs: " BMR 1305    1# loss weekly sedentary:   1066            1# loss weekly lightly active:  1294  Protein:68-85 gm      (1.2-1.5g/kg IBW)  Fluid: 66 oz     (35mL/kg IBW)     Nutrition Diagnosis  No;    Current BMI is WNL for age     Nutrition Intervention  Nutrition Prescription  Calories: 6466-8472  Protein: >65 gm     Meal Plan  Breakfast: 200-275, 12-20  Snack: skip  Lunch: 400, 15-25  Snack 160, 15  Dinner: 350-400, 25  Snack: skip     Nutrition Education:    Healthy Core Manual  Calorie controlled menu  Lean protein food choices  Healthy snack options  Food journaling tips  Dining out     Nutrition Counseling:  Strategies: meal planning, portion sizes, healthy snack choices, hydration, fiber intake, protein intake, exercise, food journal        Monitoring and Evaluation:  Evaluation criteria:  Energy Intake  Meet protein needs  Maintain adequate hydration  Monitor weekly weight  Meal planning/preparation  Food journal   Decreased portions at mealtimes and snacks  Physical activity      Barriers to learning:none  Readiness to change:  action  Comprehension: very good  Expected Compliance: very good   .
